# Patient Record
Sex: MALE | Race: WHITE
[De-identification: names, ages, dates, MRNs, and addresses within clinical notes are randomized per-mention and may not be internally consistent; named-entity substitution may affect disease eponyms.]

---

## 2020-11-09 ENCOUNTER — HOSPITAL ENCOUNTER (EMERGENCY)
Dept: HOSPITAL 50 - VM.ED | Age: 85
Discharge: HOME | End: 2020-11-09
Payer: OTHER GOVERNMENT

## 2020-11-09 DIAGNOSIS — Z20.828: ICD-10-CM

## 2020-11-09 DIAGNOSIS — J45.901: Primary | ICD-10-CM

## 2020-11-09 LAB
ANION GAP SERPL CALC-SCNC: 16.9 MMOL/L (ref 10–20)
CHLORIDE SERPL-SCNC: 103 MMOL/L (ref 98–107)
SODIUM SERPL-SCNC: 137 MMOL/L (ref 136–145)

## 2020-11-09 PROCEDURE — U0002 COVID-19 LAB TEST NON-CDC: HCPCS

## 2020-11-09 NOTE — EDM.PDOC
ED HPI GENERAL MEDICAL PROBLEM





- General


Chief Complaint: Respiratory Problem


Stated Complaint: Cough / SOB


Time Seen by Provider: 11/09/20 12:30


Source of Information: Reports: Patient


History Limitations: Reports: No Limitations





- History of Present Illness


INITIAL COMMENTS - FREE TEXT/NARRATIVE: 





Pt. was sent to ER at the request of his PCP. Pt. states that he has been experi

encing cough, chest congestion, chest tightness for the past several days. He 

states that he has a history of COPD and states that he has an exacerbation 

usually in the winter. He states that this is how he has felt in the past. 

Denies any fever or chills. No nausea, vomiting, or diarrhea. No loss of taste 

or smell.





Onset: Today


Location: Reports: Chest, Generalized


Associated Symptoms: Reports: Shortness of Breath





- Related Data


                                    Allergies











Allergy/AdvReac Type Severity Reaction Status Date / Time


 


No Known Allergies Allergy   Verified 11/09/20 10:58














ED ROS GENERAL





- Review of Systems


Review Of Systems: See Below


Constitutional: Reports: Fatigue


HEENT: Reports: No Symptoms


Respiratory: Reports: Shortness of Breath, Cough


Cardiovascular: Reports: No Symptoms


Endocrine: Reports: No Symptoms


GI/Abdominal: Reports: No Symptoms


: Reports: No Symptoms


Musculoskeletal: Reports: No Symptoms


Skin: Reports: No Symptoms


Neurological: Reports: No Symptoms


Psychiatric: Reports: No Symptoms


Hematologic/Lymphatic: Reports: No Symptoms


Immunologic: Reports: No Symptoms





ED EXAM, GENERAL





- Physical Exam


Exam: See Below


Exam Limited By: No Limitations


General Appearance: Alert, WD/WN, No Apparent Distress


Throat/Mouth: Normal Inspection, Normal Lips, Normal Teeth, Normal Gums, Normal 

Oropharynx, Normal Voice, No Airway Compromise


Head: Atraumatic, Normocephalic


Neck: Normal Inspection, Supple, Non-Tender, Full Range of Motion


Respiratory/Chest: No Respiratory Distress, Wheezing, Prolonged Expiration


Peripheral Pulses: 4+: Radial (L)


GI/Abdominal: Soft, Non-Tender, No Mass


 (Male) Exam: Deferred


Rectal (Males) Exam: Deferred


Back Exam: Normal Inspection, Full Range of Motion


Extremities: Normal Inspection, Normal Range of Motion, Non-Tender, No Pedal 

Edema, Normal Capillary Refill


Neurological: Alert, Oriented, CN II-XII Intact, Normal Cognition, Normal Gait, 

Normal Reflexes, No Motor/Sensory Deficits


Psychiatric: Normal Affect, Normal Mood


Skin Exam: Warm, Dry, Intact, Normal Color, No Rash





Course





- Orders/Labs/Meds


Orders: 





                               Active Orders 24 hr











 Category Date Time Status


 


 EKG Documentation Completion [RC] STAT Care  11/09/20 11:12 Active


 


 INFLUENZA A+B AG SCREEN [RM] Stat Lab  11/09/20 11:13 Ordered











Labs: 





                                Laboratory Tests











  11/09/20 11/09/20 11/09/20 Range/Units





  10:39 11:23 11:23 


 


WBC   6.1   (4.0-10.0)  x10^3/uL


 


RBC   3.63 L   (4.5-6.0)  x10^6/uL


 


Hgb   11.3 L   (14.0-18.0)  g/dL


 


Hct   33.6 L   (40.0-52.0)  %


 


MCV   92.6   (78.0-93.0)  fL


 


MCH   31.1   (26.0-32.0)  pg


 


MCHC   33.6   (32.0-36.0)  g/dL


 


RDW Coeff of Jasmyn   13.4   (10.0-15.0)  %


 


Plt Count   156   (130-400)  x10^3/uL


 


Neut % (Auto)   81.8 H   (50.0-80.0)  %


 


Lymph % (Auto)   9.8 L   (25.0-50.0)  %


 


Mono % (Auto)   8.0   (2.0-11.0)  %


 


Eos % (Auto)   0.2   (0.0-4.0)  %


 


Baso % (Auto)   0.2   (0.2-1.2)  %


 


PT    12.5 H  (9.5-12.3)  SEC


 


INR    1.2 L  (2.0-3.5)  


 


Sodium     (136-145)  mmol/L


 


Potassium     (3.5-5.1)  mmol/L


 


Chloride     ()  mmol/L


 


Carbon Dioxide     (21-32)  mmol/L


 


Anion Gap     (10-20)  mmol/L


 


BUN     (7-18)  mg/dL


 


Creatinine     (0.70-1.30)  mg/dL


 


Est Cr Clr Drug Dosing     


 


Estimated GFR (MDRD)     


 


Glucose     ()  mg/dL


 


Calcium     (8.5-10.1)  mg/dL


 


Corrected Calcium     (8.5-10.1)  mg/dL


 


Total Bilirubin     (0.2-1.0)  mg/dL


 


AST     (15-37)  U/L


 


ALT     (16-63)  U/L


 


Alkaline Phosphatase     ()  U/L


 


Troponin I     (<=0.056)  ng/mL


 


Total Protein     (6.4-8.2)  g/dL


 


Albumin     (3.4-5.0)  g/dL


 


Globulin     


 


Albumin/Globulin Ratio     


 


SARS CoV-2 RNA Rapid ANNABEL  Negative    (NEGATIVE)  














  11/09/20 Range/Units





  11:23 


 


WBC   (4.0-10.0)  x10^3/uL


 


RBC   (4.5-6.0)  x10^6/uL


 


Hgb   (14.0-18.0)  g/dL


 


Hct   (40.0-52.0)  %


 


MCV   (78.0-93.0)  fL


 


MCH   (26.0-32.0)  pg


 


MCHC   (32.0-36.0)  g/dL


 


RDW Coeff of Jasmyn   (10.0-15.0)  %


 


Plt Count   (130-400)  x10^3/uL


 


Neut % (Auto)   (50.0-80.0)  %


 


Lymph % (Auto)   (25.0-50.0)  %


 


Mono % (Auto)   (2.0-11.0)  %


 


Eos % (Auto)   (0.0-4.0)  %


 


Baso % (Auto)   (0.2-1.2)  %


 


PT   (9.5-12.3)  SEC


 


INR   (2.0-3.5)  


 


Sodium  137  (136-145)  mmol/L


 


Potassium  3.9  (3.5-5.1)  mmol/L


 


Chloride  103  ()  mmol/L


 


Carbon Dioxide  21  (21-32)  mmol/L


 


Anion Gap  16.9  (10-20)  mmol/L


 


BUN  23 H  (7-18)  mg/dL


 


Creatinine  1.6 H  (0.70-1.30)  mg/dL


 


Est Cr Clr Drug Dosing  TNP  


 


Estimated GFR (MDRD)  41  


 


Glucose  122 H  ()  mg/dL


 


Calcium  9.3  (8.5-10.1)  mg/dL


 


Corrected Calcium  9.54  (8.5-10.1)  mg/dL


 


Total Bilirubin  1.6 H  (0.2-1.0)  mg/dL


 


AST  20  (15-37)  U/L


 


ALT  20  (16-63)  U/L


 


Alkaline Phosphatase  39 L  ()  U/L


 


Troponin I  < 0.017  (<=0.056)  ng/mL


 


Total Protein  7.8  (6.4-8.2)  g/dL


 


Albumin  3.7  (3.4-5.0)  g/dL


 


Globulin  4.1  


 


Albumin/Globulin Ratio  0.90  


 


SARS CoV-2 RNA Rapid ANNABEL   (NEGATIVE)  














Departure





- Departure


Time of Disposition: 15:00


Disposition: Home, Self-Care 01


Clinical Impression: 


 COPD exacerbation








- Discharge Information


Instructions:  Chronic Obstructive Pulmonary Disease Exacerbation, Albuterol 

inhalation aerosol, Prednisone tablets


Referrals: 


PCP,None [Primary Care Provider] - 


Forms:  ED Department Discharge


Additional Instructions: 


Albuterol HFA


2 puffs every 4-6 hours as needed for cough/trouble breathing





Prednisone 20mg


2 tabs every day





Drink plenty of fluids





Return to ER if symptoms get worse.








- My Orders


Last 24 Hours: 





My Active Orders





11/09/20 11:12


EKG Documentation Completion [RC] STAT 





11/09/20 11:13


INFLUENZA A+B AG SCREEN [RM] Stat 














- Assessment/Plan


Last 24 Hours: 





My Active Orders





11/09/20 11:12


EKG Documentation Completion [RC] STAT 





11/09/20 11:13


INFLUENZA A+B AG SCREEN [RM] Stat 











Plan: 


Covid test was negative. EKG and cardiac enzymes were WNL. Chest x-ray showed 

hyperinflation and evidence of COPD without evidence of pneumonia.





Albuterol HFA


2 puffs every 4-6 hours as needed for cough/trouble breathing





Prednisone 20mg


2 tabs every day





Drink plenty of fluids





Return to ER if symptoms get worse.

## 2021-07-12 NOTE — CR
______________________________________________________________________________   

  

[id number] RAD/RAD Chest PA or AP 1V  

EXAM: FRONTAL CHEST  

   

 INDICATION: INTUBATION.  

   

 COMPARISON: November 9, 2020.  

   

 DISCUSSION: Unchanged calcified left pleural plaques are suggested. Endotracheal  

 tube in satisfactory position tip about 38 mm above the livia. No acute  

 infiltrates. Normal heart size.  

   

 IMPRESSION:    

 1.  Endotracheal tube tip in satisfactory position.  

   

 Electronically signed by Huey Hutchins MD on 7/12/2021 1:21 PM  

   

  

Huey Hutchins MD                 

 07/12/21 [id number]    

  

Thank you for allowing us to participate in the care of your patient.

## 2021-07-12 NOTE — EDM.PDOC
ED HPI GENERAL MEDICAL PROBLEM





- General


Stated Complaint: RESPIRATORY


Time Seen by Provider: 07/12/21 12:30


Source of Information: Reports: EMS, Family


History Limitations: Reports: Other (intubated)





- History of Present Illness


INITIAL COMMENTS - FREE TEXT/NARRATIVE: 





Patient has been allegedly not feeling well per wife.  Not sure on specifics but

possibly some abdominal pain.  [facility] [place] 

tom[facility].  He picked up the phone at about 12 and had a syncopal episode, 

witnessed by his wife.  She called 911.  PD arrived.  applied AED and started 

CPR.  Agonal rhythm, shock x 2 at 200 J. EMS arrived after 8-10 minutes and 

found a varying pulse.  one of epi was given IV and pulse noted. .  Intubated  

and good end tidal CO2, transported to hospital without any vitals.  Patient was

adequately ventilated by bag valve mask.  rhythm deteriorated to V fib and 

another of epi give, CPR started and shock delivered at 200 J.  normal sinus 

rate of 80 noted.  Ventilation started.  Patient started to fight the tube.  100

mg of succinylcholine and 1 of versed given 


Onset: Today, Sudden





- Related Data


                                    Allergies











Allergy/AdvReac Type Severity Reaction Status Date / Time


 


No Known Allergies Allergy   Verified 11/09/20 10:58











Home Meds: 


                                    Home Meds





. [Unable to Verify Home Med List]  11/09/20 [History]











Past Medical History


Cardiovascular History: Reports: Hypertension


Hematologic History: Reports: Other (See Below) (skin and prostate cancer)





ED ROS GENERAL





- Review of Systems


Review Of Systems: Unable To Obtain


Reason Not Obtained: intubated, recent feeling ill





ED EXAM, CPR





- Physical Exam


Exam: See Below


Limited By: Respiratory Distress, Other (intubation)


Eye Exam: Bilateral Eye: PERRL (constricted, not reactive well but not fixed)


Nose: Normal Inspection


Head: Atraumatic


Respiratory Chest: Lungs Clear, Other (with equal sounds bilaterally intubated)


Cardiovascular: CPR In Progress


Extremities: Normal Inspection


  ** #1 Interpretation


EKG Date: 07/12/21


Rhythm: NSR


QRS: RBBB


Comparison: No Change





Course





- Orders/Labs/Meds


Orders: 


                               Active Orders 24 hr











 Category Date Time Status


 


 Cardiac Monitoring [RC] STAT Care  07/12/21 12:20 Active


 


 Oxygen Therapy, ED [RC] ASDIRECTED Care  07/12/21 12:20 Active


 


 Chest 1V Frontal [CR] Stat Exams  07/12/21 12:22 Taken


 


 Phenylephrine [Naeem-Synephrine] 10 mg Med  07/12/21 12:45 Active





 Sodium Chloride 0.9% [Normal Saline] 100 ml   





 IV TITRATE   


 


 propofoL [Diprivan  50 ML] 50 ml Med  07/12/21 12:45 Active





 IV .TITRATE   








                                Medication Orders





Phenylephrine HCl 10 mg/ (Sodium Chloride)  101 mls @ 24.24 mls/hr IV TITRATE 

BHARTI; Protocol


Propofol (Diprivan  50 Ml)  50 mls @ 2.25 mls/hr IV .TITRATE BHARTI; Protocol








Labs: 


                                Laboratory Tests











  07/12/21 07/12/21 07/12/21 Range/Units





  12:10 12:10 12:10 


 


WBC   12.6 H   (4.0-10.0)  x10^3/uL


 


RBC   3.76 L   (4.5-6.0)  x10^6/uL


 


Hgb   11.7 L   (14.0-18.0)  g/dL


 


Hct   35.5 L   (40.0-52.0)  %


 


MCV   94.4 H   (78.0-93.0)  fL


 


MCH   31.1   (26.0-32.0)  pg


 


MCHC   33.0   (32.0-36.0)  g/dL


 


RDW Coeff of Jasmyn   13.9   (10.0-15.0)  %


 


Plt Count   211   (130-400)  x10^3/uL


 


Add Manual Diff   Yes   


 


Neutrophils % (Manual)   42 L   (50-80)  %


 


Band Neutrophils %   3   (0-6)  %


 


Lymphocytes % (Manual)   54 H   (25-50)  %


 


Eosinophils % (Manual)   1   (0-4)  %


 


Platelet Estimate   Adequate   


 


Hypochromasia   1+ slight H   


 


Anisocytosis   1+ slight H   


 


PT    12.0  (9.9-12.5)  SEC


 


INR    1.1 L  (2.0-3.5)  


 


APTT    24.5 L  (25.6-32.8)  SEC


 


D-Dimer, Quantitative    > 35.20 H  (<=0.58)  mg/LFEU


 


VBG pH     (7.33-7.43)  pH


 


VBG pCO2     (41-51)  mmHG


 


VBG pO2     mmHG


 


VBG HCO3     (22-29)  mmol/L


 


VBG Total CO2     (23-30)  mmol/L


 


VBG O2 Saturation     %


 


VBG Base Excess     ((-2)-3)  mmol/L


 


Sodium  142    (136-145)  mmol/L


 


Potassium  3.0 L    (3.5-5.1)  mmol/L


 


Chloride  106    ()  mmol/L


 


Carbon Dioxide  19 L    (21-32)  mmol/L


 


Anion Gap  20.0 H    (5-15)  mmol/L


 


BUN  18    (7-18)  mg/dL


 


Creatinine  1.7 H    (0.70-1.30)  mg/dL


 


Est Cr Clr Drug Dosing  TNP    


 


Estimated GFR (MDRD)  39    


 


Glucose  226 H    (70-99)  mg/dL


 


Calcium  8.3 L    (8.5-10.1)  mg/dL


 


Magnesium     (1.8-2.4)  mg/dL


 


Troponin I High Sens  73    (<=76)  ng/L














  07/12/21 07/12/21 Range/Units





  12:10 12:10 


 


WBC    (4.0-10.0)  x10^3/uL


 


RBC    (4.5-6.0)  x10^6/uL


 


Hgb    (14.0-18.0)  g/dL


 


Hct    (40.0-52.0)  %


 


MCV    (78.0-93.0)  fL


 


MCH    (26.0-32.0)  pg


 


MCHC    (32.0-36.0)  g/dL


 


RDW Coeff of Jasmyn    (10.0-15.0)  %


 


Plt Count    (130-400)  x10^3/uL


 


Add Manual Diff    


 


Neutrophils % (Manual)    (50-80)  %


 


Band Neutrophils %    (0-6)  %


 


Lymphocytes % (Manual)    (25-50)  %


 


Eosinophils % (Manual)    (0-4)  %


 


Platelet Estimate    


 


Hypochromasia    


 


Anisocytosis    


 


PT    (9.9-12.5)  SEC


 


INR    (2.0-3.5)  


 


APTT    (25.6-32.8)  SEC


 


D-Dimer, Quantitative    (<=0.58)  mg/LFEU


 


VBG pH  7.16 L*   (7.33-7.43)  pH


 


VBG pCO2  48   (41-51)  mmHG


 


VBG pO2  146   mmHG


 


VBG HCO3  17 L   (22-29)  mmol/L


 


VBG Total CO2  19 L   (23-30)  mmol/L


 


VBG O2 Saturation  99   %


 


VBG Base Excess  -11 L   ((-2)-3)  mmol/L


 


Sodium    (136-145)  mmol/L


 


Potassium    (3.5-5.1)  mmol/L


 


Chloride    ()  mmol/L


 


Carbon Dioxide    (21-32)  mmol/L


 


Anion Gap    (5-15)  mmol/L


 


BUN    (7-18)  mg/dL


 


Creatinine    (0.70-1.30)  mg/dL


 


Est Cr Clr Drug Dosing    


 


Estimated GFR (MDRD)    


 


Glucose    (70-99)  mg/dL


 


Calcium    (8.5-10.1)  mg/dL


 


Magnesium   2.4  (1.8-2.4)  mg/dL


 


Troponin I High Sens    (<=76)  ng/L











Meds: 


Medications











Generic Name Dose Route Start Last Admin





  Trade Name Freq  PRN Reason Stop Dose Admin


 


Phenylephrine HCl 10 mg/  101 mls @ 24.24 mls/hr  07/12/21 12:45 





  Sodium Chloride  IV  





  TITRATE BHARTI  





  Protocol  





  40 MCG/MIN  


 


Propofol  50 mls @ 2.25 mls/hr  07/12/21 12:45 





  Diprivan  50 Ml  IV  





  .TITRATE BHARTI  





  Protocol  





  5 MCG/KG/MIN  














Discontinued Medications














Generic Name Dose Route Start Last Admin





  Trade Name Freq  PRN Reason Stop Dose Admin


 


Phenylephrine HCl  Confirm  07/12/21 12:50 





  Phenylephrine 1% 10 Mg/Ml Sdv  Administered  07/12/21 12:51 





  Dose  





  50 mg  





  .ROUTE  





  .[facility] ONE  














- Radiology Interpretation


Free Text/Narrative:: 





no pneumonia, no pneumothorax, tip of tube at the level of the livia





- Re-Assessments/Exams


Free Text/Narrative Re-Assessment/Exam: 





07/12/21 13:29


Managed to get labs, pulse, quit fighting the tube  initially.  Ph returned at 

7.1, not diabetic, has a history of COPD per history.  Given two amps of bicarb,

propolfol drip started at 5, continued to overbreath the tube, increased to 10. 

Pressures initially soft, improved to 120/70.  Norsynephrine drip sent with 

ambulance.  rest of labs normal.  slight low Potassium at 3.0.  Accepted to  ICU.  Transported in improved condition with IV fluids,.  NOte that 

the IV from the fluid infiltrated in the right AC, may have gotten most of first

dose of succinycholine here. 





Departure





- Departure


Time of Disposition: 12:40


Disposition: DC/Tfer to Acute Hospital 02


Condition: Poor


Clinical Impression: 


 Cardiac arrest, Arrhythmia








- Discharge Information


*PRESCRIPTION DRUG MONITORING PROGRAM REVIEWED*: Not Applicable


*COPY OF PRESCRIPTION DRUG MONITORING REPORT IN PATIENT RAINA: Not Applicable


Forms:  Interfacility Transfer EMTALA





- My Orders


Last 24 Hours: 


My Active Orders





07/12/21 12:20


Cardiac Monitoring [RC] STAT 


Oxygen Therapy, ED [] ASDIRECTED 





07/12/21 12:22


Chest 1V Frontal [CR] Stat 





07/12/21 12:45


Phenylephrine [Naeem-Synephrine] 10 mg   Sodium Chloride 0.9% [Normal Saline] 100 

ml IV TITRATE 


propofoL [Diprivan  50 ML] 50 ml IV .TITRATE 














- Assessment/Plan


Last 24 Hours: 


My Active Orders





07/12/21 12:20


Cardiac Monitoring [RC] STAT 


Oxygen Therapy, ED [] ASDIRECTED 





07/12/21 12:22


Chest 1V Frontal [CR] Stat 





07/12/21 12:45


Phenylephrine [Naeem-Synephrine] 10 mg   Sodium Chloride 0.9% [Normal Saline] 100 

ml IV TITRATE 


propofoL [Diprivan  50 ML] 50 ml IV .TITRATE

## 2025-03-30 NOTE — CR
______________________________________________________________________________   

  

[id number] RAD/RAD Chest PA And Lateral  

EXAM:  RAD Chest PA And Lateral  

   

 INDICATION:  SYNCOPE, BRADYCARDIA.  

   

 COMPARISON:  2012.  

   

 DISCUSSION:    

   

 Cardiomediastinal silhouette is normal in size and contour.  

   

 COPD, including emphysema resulting bilateral symmetric lung hyperinflation.  

 Bibasal scarring. No evidence of pneumonia, effusion, edema, or pneumothorax.  

 Stable left mid/upper lung rounded opacity, unchanged from 2012.  

   

 Chronic healed left-sided rib fractures.  

   

 IMPRESSION:  

 No acute findings. Chronic findings are described above.  

   

 Electronically signed by Adam Maravilla MD on 11/9/2020 12:02 PM  

   

  

Adam Maravilla MD                 

 11/09/20 [id number]    

  

Thank you for allowing us to participate in the care of your patient. 30-Mar-2025 13:55